# Patient Record
(demographics unavailable — no encounter records)

---

## 2025-01-06 NOTE — PHYSICAL EXAM
[Well Developed] : well developed [Normal Venous Pressure] : normal venous pressure [Normal S1, S2] : normal S1, S2 [Murmur] : murmur [Clear Lung Fields] : clear lung fields [No Edema] : no edema [Normal Radial B/L] : normal radial B/L [Diminished Pedal Pulses ___] : diminished pedal pulses [unfilled]

## 2025-01-06 NOTE — REVIEW OF SYSTEMS
[Negative] : Heme/Lymph [Fever] : no fever [Headache] : no headache [Weight Gain (___ Lbs)] : no recent weight gain [Chills] : no chills [Feeling Fatigued] : not feeling fatigued [Weight Loss (___ Lbs)] : no recent weight loss [SOB] : no shortness of breath [Dyspnea on exertion] : not dyspnea during exertion [Chest Discomfort] : no chest discomfort [Lower Ext Edema] : no extremity edema [Leg Claudication] : no intermittent leg claudication [Palpitations] : no palpitations [Orthopnea] : no orthopnea [PND] : no PND [Syncope] : no syncope [FreeTextEntry5] : Surgical scar healing well [FreeTextEntry7] : Occasional diarrhea.

## 2025-01-06 NOTE — CARDIOLOGY SUMMARY
[de-identified] : July 12, 2021 normal sinus rhythm September 26, 2022.  Sinus bradycardia. November 10, 2023 normal sinus rhythm 5/17/24 sinus bradycardia  1/6/25 nsr  [de-identified] : Event monitoring.November 12, 2021.  No PAF noted [de-identified] : Nuclear marker perfusion scan September 9, 2021 normal EF.  Inferior inferoseptal infarct with malcolm-infarct ischemia. [de-identified] : May 19, 2023 technically difficult study.  EF preserved.  No significant valvular stenosis or regurgitation. [de-identified] : September 23, 2021.  Multivessel CAD. [de-identified] : CABG  status post CABG x4 involving LIMA to LAD SVG to RPDA, OM1, D1 10/2021

## 2025-01-06 NOTE — REASON FOR VISIT
[Symptom and Test Evaluation] : symptom and test evaluation [Hyperlipidemia] : hyperlipidemia [Hypertension] : hypertension [Coronary Artery Disease] : coronary artery disease [Spouse] : spouse [FreeTextEntry3] : Dr. Herberth Lombardo [FreeTextEntry1] : 72-year-old gentleman comes in for follow-up consultation.  EKG and labs were reviewed. c/o buttock pain/ leg tingling. while walking at rehab. He has been doing very well.  Has been walking( 2 miles few times a week0.  Using treadmill/bicycle.  He has no chest pain.  He has no PND orthopnea pedal edema.  He has no palpitation dizziness near syncopal or syncopal event.  has been also going to cardiac rehab without any significant problems. He has no pedal edema, palpitations, dizziness, or syncopal episode. He has no claudication pain. He has no cough, fever, or chills. His weight is stable.

## 2025-01-06 NOTE — DISCUSSION/SUMMARY
[FreeTextEntry1] : 72 year-old is seen today for followup consultation with active medical problems and management as noted below  possible aorto illiac disease: vs spinal stenosis: check JUAREZ/PVR studies.   CAD.  Status post CABG.   high dose of beta-blockers and high intensity statin therapy.  Cardiac rehab doing well.  PVCs on monitoring.    Preserved EF.  No signs of unstable CAD.  Sinus bradycardia. Continue aspirin 81. Regular exercise program at home. Continue  Atorvastatin 80 mg.  add zetia 10 mg a day. risks/benefits side effects reviewed. labs ordered. . His blood pressure heart rate is stable.  LV systolic function is normal.  No anginal symptoms.   metoprolol succinate to 50 mg daily.  Any change in clinical status he will contact us. Call for any changes in his symptoms  Essential hypertension without any history of congestive heart failure or renal insufficiency. His nonsmoker. Well-controlled. Continue present medications goal less than 130/80  Hyperlipidemia.  Atorvastatin 80 mg. Tolerating it well.  Continue lifestyle modifications LDL cholesterol at goal of less than 70. add zetia 10 mg a day.  Type 2 diabetes mellitus . uncomplicated. controlled.   Sleep apnea.  Not using CPAP.  Had polypectomy with ENT.  According to him he is sleeping well.  Decided against need for CPAP after evaluation by pulmonologist.  Erectile dysfunction. decrease netolrolol.  Reviewed possible vascular disease plus medications.  Recommended to discuss with urologist.  No contraindication for sildenafil like medications from cardiac point of view.  Risk benefits alternatives side effects reviewed  LFT abnormality.  Mild.  Hep screen negative. resolved.  Counseling regarding low saturated fat, salt and carbohydrate intake was reviewed. Active lifestyle and regular. Exercise along with weight maintenance is advised. All the above were at length reviewed. Answered all the questions. Thank you very much for this kind referral. Please do not hesitate to give me a call for any question. Part of this transcription was done with voice recognition software and phonetically similar errors are common. I apologize for that. Please donot hesitate to call for any questions due to above.  Sincerely, Marie Humphreys MD,[facility],JAIR  [EKG obtained to assist in diagnosis and management of assessed problem(s)] : EKG obtained to assist in diagnosis and management of assessed problem(s)

## 2025-01-06 NOTE — ASSESSMENT
[FreeTextEntry1] : Reviewed on November 10, 2023. Recent labs and EKGs were reviewed.  LDL cholesterol 72.  CMP stable except mild LFT abnormality.  As noted above. Labs which were done on 4/5/2024.  Total cholesterolReviewed on May 17, 2024.  triglycerides 97 HDL 35 LDL 68 hep screen negative.  CBC was normal.  Hemoglobin A1c was 7.7 creatinine 1.0 potassium 4.4 sodium 139 SGOT PT 46/61.  TSH 1.10.N-terminal proBNP was less than 27.  1/6/25 ekg and recent labs reviewed.  11/12/24 cbc, na 139 k 3.9 creat 0.9  lft hab1c 6.4 ldl84, hdl 44

## 2025-01-06 NOTE — HISTORY OF PRESENT ILLNESS
[FreeTextEntry1] : Medical History:\par  Hypertension: active, taking med for 2 years, no CHF, no CK D, nonsmoker. Onset is gradual Severity is mild.\par  Hyperlipidemia: active, on statins. tolerating it well.\par  Diabetes: active, type 2 , uncomplicated. Onset is gradual Severity is moderate.\par  overweight: active Onset is gradual Severity is mild.\par  abnormal liver function test active hep screen negative, follows with gastroenterologist.\par  Calcific coronary artery disease. abnormal stress test asymptomatic, opted for medical management\par  renal stone disease.  Now status post CABG x4 involving LIMA to LAD SVG to RPDA, OM1, D1\par  Diagnosis of obstructive sleep apnea.  Trying to get used to his CPAP.

## 2025-04-07 NOTE — HISTORY OF PRESENT ILLNESS
[de-identified] : Body Part: lower back Length of symptoms/ DOI: 2025 Cause of accident/ injury: NKI Radicular symptoms: into the glutes, down bilateral legs to the calves, has tingling Quality of pain: throbbing Pain at Rest: 4/10 Pain with activity/ walkin/10 Pain worsens with: walking Pain improves with: stretching Previous treatments: none Imaging: none Current treatments: none Current medications for pain: none Work status/ occupation: retired Assisted walking device: none

## 2025-04-07 NOTE — ASSESSMENT
[FreeTextEntry1] : 71 yo male, hx T2DM and current Plavix, presents today for eval of his low back pain. XR with evidence of spondylolisthesis L4-5. Recommend MRI for further evaluation of nerve impingement.   - Patient given prescription for MRI, follow up after study is completed to discuss results.   - Recommend physical therapy to regain range of motion, strengthening and symptomatic improvement. Prescription given in office today.   - Patient given a prescription for gabapentin 300mg qhs today for their nerve pain. Advised patient on potential side effects, including drowsiness.   - Recommend Tylenol PRN - Recommend heating pad use to decrease muscle spasm - Discussed the importance of home exercises, including but not limited to hamstring stretching and core strengthening   Patient was educated on their diagnosis today. All questions answered and patient expressed understanding.   F/U after MRI

## 2025-05-19 NOTE — DATA REVIEWED
[MRI] : MRI [Lumbar Spine] : lumbar spine [I independently reviewed and interpreted images and report] : I independently reviewed and interpreted images and report [FreeTextEntry1] : 4/2025 MRI of L-Spine was reviewed today and is as follows:  Mild stenosis L3-4 Spondylolisthesis L4-5 Severe stenosis L4-5

## 2025-05-19 NOTE — HISTORY OF PRESENT ILLNESS
[de-identified] : Body part: lower back Better/ Worse/ Same since last visit: a little better Treatments since last visit: MRI at [facility], PT [facility]x a [facility], [facility] Difficulty with: walking Radicular symptoms: into the glutes, down bilateral legs to the calves, has tingling Current medications for pain: Gabapentin Assistive walking device: none   Today's Pain:  5/10

## 2025-05-19 NOTE — ASSESSMENT
[FreeTextEntry1] : 4/2025 MRI of L-Spine was reviewed today and is as follows:  Mild stenosis L3-4 Spondylolisthesis L4-5 Severe stenosis L4-5  71 yo male, hx T2DM and current Plavix, presents today for eval of his low back pain.   MRI with evidence of severe stenosis and spondylolisthesis at L4-5. He has persistent claudication symptoms despite PT. He is a candidate for injection.   - Referral to pain management SBPM for bilateral L4-5 TESI   - Continue physical therapy to regain range of motion, strengthening and symptomatic improvement. Prescription given in office today.   - Recommend Tylenol PRN - Recommend heating pad use to decrease muscle spasm - Discussed the importance of home exercises, including but not limited to hamstring stretching and core strengthening   Patient was educated on their diagnosis today. All questions answered and patient expressed understanding.   Follow up in 3 months

## 2025-06-23 NOTE — REASON FOR VISIT
[Symptom and Test Evaluation] : symptom and test evaluation [Hyperlipidemia] : hyperlipidemia [Hypertension] : hypertension [Coronary Artery Disease] : coronary artery disease [Spouse] : spouse [FreeTextEntry3] : Dr. Herberth Lombardo [FreeTextEntry1] : 72-year-old gentleman comes in for follow-up consultation.   He has been doing very well from cardiac point of view.  He has no chest pain.  He has no PND orthopnea pedal edema.  He has no palpitation dizziness near syncopal or syncopal event.  has been also going to cardiac rehab without any significant problems. He has no pedal edema, palpitations, dizziness, or syncopal episode. He has no claudication pain. He has no cough, fever, or chills. His weight is stable.

## 2025-06-23 NOTE — CARDIOLOGY SUMMARY
[de-identified] : July 12, 2021 normal sinus rhythm September 26, 2022.  Sinus bradycardia. November 10, 2023 normal sinus rhythm 5/17/24 sinus bradycardia  1/6/25 nsr  [de-identified] : Event monitoring.November 12, 2021.  No PAF noted [de-identified] : Nuclear myocardial perfusion scan September 9, 2021 normal EF.  Inferior inferoseptal infarct with malcolm-infarct ischemia. [de-identified] : May 19, 2023 technically difficult study.  EF preserved.  No significant valvular stenosis or regurgitation. [de-identified] : September 23, 2021.  Multivessel CAD. [de-identified] : CABG  status post CABG x4 involving LIMA to LAD SVG to RPDA, OM1, D1 10/2021  [de-identified] : JUAREZ/PVR studies 1/25 nonobstructive

## 2025-06-23 NOTE — CARDIOLOGY SUMMARY
[de-identified] : July 12, 2021 normal sinus rhythm September 26, 2022.  Sinus bradycardia. November 10, 2023 normal sinus rhythm 5/17/24 sinus bradycardia  1/6/25 nsr  [de-identified] : Event monitoring.November 12, 2021.  No PAF noted [de-identified] : Nuclear myocardial perfusion scan September 9, 2021 normal EF.  Inferior inferoseptal infarct with malcolm-infarct ischemia. [de-identified] : May 19, 2023 technically difficult study.  EF preserved.  No significant valvular stenosis or regurgitation. [de-identified] : September 23, 2021.  Multivessel CAD. [de-identified] : CABG  status post CABG x4 involving LIMA to LAD SVG to RPDA, OM1, D1 10/2021  [de-identified] : JUAREZ/PVR studies 1/25 nonobstructive

## 2025-06-23 NOTE — DISCUSSION/SUMMARY
[FreeTextEntry1] : 72 year-old is seen today for followup consultation with active medical problems and management as noted below  Continued back pain.  JUAREZ/PVR study nonobstructive.  To be seen by back surgery.  Getting physical therapy without any success.  CAD.  Status post CABG.   high dose of beta-blockers and high intensity statin therapy.  Cardiac rehab doing well.  PVCs on monitoring.    Preserved EF.  No signs of unstable CAD.  Sinus bradycardia. Continue aspirin 81. Regular exercise program at home. Continue  Atorvastatin 80 mg.  zetia 10 mg a day. risks/benefits side effects reviewed. labs ordered. . His blood pressure heart rate is stable.  LV systolic function is normal.  No anginal symptoms.   metoprolol succinate to 50 mg daily.  Any change in clinical status he will contact us. Call for any changes in his symptoms  Essential hypertension without any history of congestive heart failure or renal insufficiency. His nonsmoker. Well-controlled. Continue present medications goal less than 130/80  Hyperlipidemia.  Atorvastatin 80 mg. Tolerating it well.  Continue lifestyle modifications LDL cholesterol at goal of less than 70. add zetia 10 mg a day.  Type 2 diabetes mellitus . uncomplicated. controlled.   Sleep apnea.  Not using CPAP.  Had polypectomy with ENT.  According to him he is sleeping well.  Decided against need for CPAP after evaluation by pulmonologist.  Erectile dysfunction.   Reviewed possible vascular disease plus medications.  Recommended to discuss with urologist.  No contraindication for sildenafil like medications from cardiac point of view.  Risk benefits alternatives side effects reviewed  LFT abnormality.   Hep screen negative. resolved.  Counseling regarding low saturated fat, salt and carbohydrate intake was reviewed. Active lifestyle and regular. Exercise along with weight maintenance is advised. All the above were at length reviewed. Answered all the questions. Thank you very much for this kind referral. Please do not hesitate to give me a call for any question. Part of this transcription was done with voice recognition software and phonetically similar errors are common. I apologize for that. Please donot hesitate to call for any questions due to above.  Sincerely, Marie Humphreys MD,[facility],JAIR

## 2025-06-23 NOTE — PHYSICAL EXAM
[No Acute Distress] : no acute distress [Normal S1, S2] : normal S1, S2 [Clear Lung Fields] : clear lung fields [Abnormal Gait] : abnormal gait [No Edema] : no edema

## 2025-06-23 NOTE — REVIEW OF SYSTEMS
[Negative] : Heme/Lymph [Joint Stiffness] : joint stiffness [Numbness (Hypoesthesia)] : numbness [Tingling (Paresthesia)] : tingling [Fever] : no fever [Headache] : no headache [Weight Gain (___ Lbs)] : no recent weight gain [Chills] : no chills [Feeling Fatigued] : not feeling fatigued [Weight Loss (___ Lbs)] : no recent weight loss [SOB] : no shortness of breath [Dyspnea on exertion] : not dyspnea during exertion [Chest Discomfort] : no chest discomfort [Lower Ext Edema] : no extremity edema [Leg Claudication] : no intermittent leg claudication [Palpitations] : no palpitations [Orthopnea] : no orthopnea [PND] : no PND [Syncope] : no syncope [FreeTextEntry5] : Surgical scar healing well [FreeTextEntry7] : Occasional diarrhea.

## 2025-06-23 NOTE — ASSESSMENT
[FreeTextEntry1] : Reviewed on November 10, 2023. Recent labs and EKGs were reviewed.  LDL cholesterol 72.  CMP stable except mild LFT abnormality.  As noted above. Labs which were done on 4/5/2024.  Total cholesterolReviewed on May 17, 2024.  triglycerides 97 HDL 35 LDL 68 hep screen negative.  CBC was normal.  Hemoglobin A1c was 7.7 creatinine 1.0 potassium 4.4 sodium 139 SGOT PT 46/61.  TSH 1.10.N-terminal proBNP was less than 27.  1/6/25 ekg and recent labs reviewed.  11/12/24 cbc, na 139 k 3.9 creat 0.9  lft hab1c 6.4 ldl84, hdl 44  reviewed 6/23/25 6/13/25 cmp stable ldl 51 hdl 50 JUAREZ/PVR study was reviewed.

## 2025-07-28 NOTE — HISTORY OF PRESENT ILLNESS
[de-identified] : Body part: Lumbar spine  Better/ Worse/ Same since last visit: Same  Treatments since last visit: Finished PT with little improvement, Denies having injections in the past  Difficulty with: walking, bike riding, physical activity  Radicular symptoms: Pain and tingling in bilat legs into calves  Current medications for pain: Gabapentin  Assistive walking device: none   Today's Average Pain:  ~8/10

## 2025-07-28 NOTE — REASON FOR VISIT
Called patient to confirm appointment for 10/3/24. Patient confirmed appointment.  
[FreeTextEntry2] : lower back pain since 1/2025 with NKI
[FreeTextEntry2] : lower back pain since 1/2025 with NKI

## 2025-07-28 NOTE — HISTORY OF PRESENT ILLNESS
[de-identified] : Body part: Lumbar spine  Better/ Worse/ Same since last visit: Same  Treatments since last visit: Finished PT with little improvement, Denies having injections in the past  Difficulty with: walking, bike riding, physical activity  Radicular symptoms: Pain and tingling in bilat legs into calves  Current medications for pain: Gabapentin  Assistive walking device: none   Today's Average Pain:  ~8/10

## 2025-07-28 NOTE — ASSESSMENT
[FreeTextEntry1] : Surgery - Laminectomy L4-5 with Coflex stabilization at [facility]   4/2025 MRI of L-Spine was reviewed today and is as follows:  Mild stenosis L3-4 Grade 1 spondylolisthesis L4-5 with minimal loss of anterior disc height  Severe stenosis L4-5  73 yo male, hx T2DM and current Plavix, presents today for eval of his low back pain.   Patient with minimal improvement in leg pain following PT but with improvements in low back pain. Given persistent radicular symptoms, we discussed that he may benefit from injection for relief. However, given severe stenosis with claudication symptoms resulting in functional decline, he is a candidate for surgical management. He has elected to proceed with surgery.   - Patient with persistent symptoms refractory to non-operative care. Patient is seeking surgical options. Patient is a candidate for surgery after failing extensive non operative care.  - Recommend Tylenol PRN - Recommend heating pad use to decrease muscle spasm - Discussed the importance of home exercises, including but not limited to hamstring stretching and core strengthening   Patient was educated on their diagnosis today. All questions answered and patient expressed understanding.   Follow up PRN then 2 weeks after surgery

## 2025-07-28 NOTE — ASSESSMENT
[FreeTextEntry1] : Surgery - Laminectomy L4-5 with Coflex stabilization at [facility]   4/2025 MRI of L-Spine was reviewed today and is as follows:  Mild stenosis L3-4 Grade 1 spondylolisthesis L4-5 with minimal loss of anterior disc height  Severe stenosis L4-5  73 yo male, hx T2DM and current Plavix, presents today for eval of his low back pain.   Patient with minimal improvement in leg pain following PT but with improvements in low back pain. Given persistent radicular symptoms, we discussed that he may benefit from injection for relief. However, given severe stenosis with claudication symptoms resulting in functional decline, he is a candidate for surgical management. He has elected to proceed with surgery.   - Patient with persistent symptoms refractory to non-operative care. Patient is seeking surgical options. Patient is a candidate for surgery after failing extensive non operative care.  - Recommend Tylenol PRN - Recommend heating pad use to decrease muscle spasm - Discussed the importance of home exercises, including but not limited to hamstring stretching and core strengthening   Patient was educated on their diagnosis today. All questions answered and patient expressed understanding.   Follow up PRN then 2 weeks after surgery